# Patient Record
Sex: FEMALE | Race: WHITE | NOT HISPANIC OR LATINO | URBAN - METROPOLITAN AREA
[De-identification: names, ages, dates, MRNs, and addresses within clinical notes are randomized per-mention and may not be internally consistent; named-entity substitution may affect disease eponyms.]

---

## 2017-03-05 ENCOUNTER — INPATIENT (INPATIENT)
Facility: HOSPITAL | Age: 72
LOS: 3 days | Discharge: NURSING, REHAB OR LONG-TERM CARE | DRG: 438 | End: 2017-03-09
Attending: FAMILY MEDICINE | Admitting: FAMILY MEDICINE
Payer: MEDICARE

## 2017-03-05 VITALS
DIASTOLIC BLOOD PRESSURE: 90 MMHG | RESPIRATION RATE: 14 BRPM | SYSTOLIC BLOOD PRESSURE: 175 MMHG | HEART RATE: 100 BPM | OXYGEN SATURATION: 94 % | TEMPERATURE: 99 F | WEIGHT: 100.09 LBS | HEIGHT: 60 IN

## 2017-03-05 DIAGNOSIS — R10.84 GENERALIZED ABDOMINAL PAIN: ICD-10-CM

## 2017-03-05 DIAGNOSIS — Z41.8 ENCOUNTER FOR OTHER PROCEDURES FOR PURPOSES OTHER THAN REMEDYING HEALTH STATE: ICD-10-CM

## 2017-03-05 DIAGNOSIS — E83.52 HYPERCALCEMIA: ICD-10-CM

## 2017-03-05 DIAGNOSIS — A41.9 SEPSIS, UNSPECIFIED ORGANISM: ICD-10-CM

## 2017-03-05 DIAGNOSIS — G35 MULTIPLE SCLEROSIS: ICD-10-CM

## 2017-03-05 DIAGNOSIS — I16.0 HYPERTENSIVE URGENCY: ICD-10-CM

## 2017-03-05 DIAGNOSIS — J18.9 PNEUMONIA, UNSPECIFIED ORGANISM: ICD-10-CM

## 2017-03-05 DIAGNOSIS — J44.9 CHRONIC OBSTRUCTIVE PULMONARY DISEASE, UNSPECIFIED: ICD-10-CM

## 2017-03-05 DIAGNOSIS — R74.8 ABNORMAL LEVELS OF OTHER SERUM ENZYMES: ICD-10-CM

## 2017-03-05 LAB
ALBUMIN SERPL ELPH-MCNC: 2.3 G/DL — LOW (ref 3.3–5)
ALP SERPL-CCNC: 164 U/L — HIGH (ref 40–120)
ALT FLD-CCNC: 12 U/L — SIGNIFICANT CHANGE UP (ref 12–78)
AMYLASE P1 CFR SERPL: 307 U/L — HIGH (ref 25–115)
ANION GAP SERPL CALC-SCNC: 8 MMOL/L — SIGNIFICANT CHANGE UP (ref 5–17)
APPEARANCE UR: ABNORMAL
APTT BLD: 30.4 SEC — SIGNIFICANT CHANGE UP (ref 27.5–37.4)
AST SERPL-CCNC: 19 U/L — SIGNIFICANT CHANGE UP (ref 15–37)
BACTERIA # UR AUTO: ABNORMAL
BILIRUB SERPL-MCNC: 0.3 MG/DL — SIGNIFICANT CHANGE UP (ref 0.2–1.2)
BILIRUB UR-MCNC: ABNORMAL
BUN SERPL-MCNC: 33 MG/DL — HIGH (ref 7–23)
CALCIUM SERPL-MCNC: 11 MG/DL — HIGH (ref 8.5–10.1)
CHLORIDE SERPL-SCNC: 100 MMOL/L — SIGNIFICANT CHANGE UP (ref 96–108)
CO2 SERPL-SCNC: 32 MMOL/L — HIGH (ref 22–31)
COLOR SPEC: YELLOW — SIGNIFICANT CHANGE UP
COMMENT - URINE: SIGNIFICANT CHANGE UP
COMMENT - URINE: SIGNIFICANT CHANGE UP
CREAT SERPL-MCNC: 0.63 MG/DL — SIGNIFICANT CHANGE UP (ref 0.5–1.3)
CRP SERPL-MCNC: 20.58 MG/DL — HIGH (ref 0–0.4)
DIFF PNL FLD: ABNORMAL
EPI CELLS # UR: ABNORMAL
GLUCOSE SERPL-MCNC: 90 MG/DL — SIGNIFICANT CHANGE UP (ref 70–99)
GLUCOSE UR QL: NEGATIVE — SIGNIFICANT CHANGE UP
HCT VFR BLD CALC: 42.6 % — SIGNIFICANT CHANGE UP (ref 34.5–45)
HGB BLD-MCNC: 13.7 G/DL — SIGNIFICANT CHANGE UP (ref 11.5–15.5)
INR BLD: 1.26 RATIO — HIGH (ref 0.88–1.16)
KETONES UR-MCNC: NEGATIVE — SIGNIFICANT CHANGE UP
LACTATE SERPL-SCNC: 1.1 MMOL/L — SIGNIFICANT CHANGE UP (ref 0.7–2)
LEUKOCYTE ESTERASE UR-ACNC: ABNORMAL
LIDOCAIN IGE QN: 971 U/L — HIGH (ref 73–393)
LYMPHOCYTES # BLD AUTO: 4 % — LOW (ref 13–44)
MCHC RBC-ENTMCNC: 29.5 PG — SIGNIFICANT CHANGE UP (ref 27–34)
MCHC RBC-ENTMCNC: 32.2 GM/DL — SIGNIFICANT CHANGE UP (ref 32–36)
MCV RBC AUTO: 91.8 FL — SIGNIFICANT CHANGE UP (ref 80–100)
MONOCYTES NFR BLD AUTO: 1 % — SIGNIFICANT CHANGE UP (ref 1–9)
MYELOCYTES NFR BLD: 1 % — HIGH (ref 0–0)
NEUTROPHILS NFR BLD AUTO: 88 % — HIGH (ref 43–77)
NEUTS BAND # BLD: 6 % — SIGNIFICANT CHANGE UP (ref 0–8)
NITRITE UR-MCNC: NEGATIVE — SIGNIFICANT CHANGE UP
PH UR: 5 — SIGNIFICANT CHANGE UP (ref 4.8–8)
PLAT MORPH BLD: NORMAL — SIGNIFICANT CHANGE UP
PLATELET # BLD AUTO: 533 K/UL — HIGH (ref 150–400)
POTASSIUM SERPL-MCNC: 5.3 MMOL/L — SIGNIFICANT CHANGE UP (ref 3.5–5.3)
POTASSIUM SERPL-SCNC: 5.3 MMOL/L — SIGNIFICANT CHANGE UP (ref 3.5–5.3)
PROT SERPL-MCNC: 6.8 G/DL — SIGNIFICANT CHANGE UP (ref 6–8.3)
PROT UR-MCNC: 75 MG/DL
PROTHROM AB SERPL-ACNC: 14 SEC — HIGH (ref 10–13.1)
RAPID RVP RESULT: SIGNIFICANT CHANGE UP
RBC # BLD: 4.64 M/UL — SIGNIFICANT CHANGE UP (ref 3.8–5.2)
RBC # FLD: 13.4 % — SIGNIFICANT CHANGE UP (ref 10.3–14.5)
RBC BLD AUTO: NORMAL — SIGNIFICANT CHANGE UP
RBC CASTS # UR COMP ASSIST: >50 /HPF (ref 0–4)
SODIUM SERPL-SCNC: 140 MMOL/L — SIGNIFICANT CHANGE UP (ref 135–145)
SP GR SPEC: 1.02 — SIGNIFICANT CHANGE UP (ref 1.01–1.02)
UROBILINOGEN FLD QL: NEGATIVE — SIGNIFICANT CHANGE UP
WBC # BLD: 18 K/UL — HIGH (ref 3.8–10.5)
WBC # FLD AUTO: 18 K/UL — HIGH (ref 3.8–10.5)
WBC UR QL: ABNORMAL

## 2017-03-05 PROCEDURE — 93970 EXTREMITY STUDY: CPT | Mod: 26

## 2017-03-05 PROCEDURE — 93010 ELECTROCARDIOGRAM REPORT: CPT

## 2017-03-05 PROCEDURE — 74177 CT ABD & PELVIS W/CONTRAST: CPT | Mod: 26

## 2017-03-05 PROCEDURE — 99223 1ST HOSP IP/OBS HIGH 75: CPT | Mod: AI,GC

## 2017-03-05 PROCEDURE — 99285 EMERGENCY DEPT VISIT HI MDM: CPT

## 2017-03-05 PROCEDURE — 71275 CT ANGIOGRAPHY CHEST: CPT | Mod: 26

## 2017-03-05 RX ORDER — GLYCERIN ADULT
1 SUPPOSITORY, RECTAL RECTAL DAILY
Qty: 0 | Refills: 0 | Status: DISCONTINUED | OUTPATIENT
Start: 2017-03-05 | End: 2017-03-09

## 2017-03-05 RX ORDER — MAGNESIUM HYDROXIDE 400 MG/1
30 TABLET, CHEWABLE ORAL DAILY
Qty: 0 | Refills: 0 | Status: DISCONTINUED | OUTPATIENT
Start: 2017-03-05 | End: 2017-03-09

## 2017-03-05 RX ORDER — ALPRAZOLAM 0.25 MG
0.25 TABLET ORAL AT BEDTIME
Qty: 0 | Refills: 0 | Status: DISCONTINUED | OUTPATIENT
Start: 2017-03-05 | End: 2017-03-09

## 2017-03-05 RX ORDER — FLUTICASONE PROPIONATE AND SALMETEROL 50; 250 UG/1; UG/1
1 POWDER ORAL; RESPIRATORY (INHALATION)
Qty: 0 | Refills: 0 | COMMUNITY

## 2017-03-05 RX ORDER — ALPRAZOLAM 0.25 MG
0.25 TABLET ORAL ONCE
Qty: 0 | Refills: 0 | Status: DISCONTINUED | OUTPATIENT
Start: 2017-03-05 | End: 2017-03-05

## 2017-03-05 RX ORDER — IPRATROPIUM/ALBUTEROL SULFATE 18-103MCG
3 AEROSOL WITH ADAPTER (GRAM) INHALATION ONCE
Qty: 0 | Refills: 0 | Status: COMPLETED | OUTPATIENT
Start: 2017-03-05 | End: 2017-03-05

## 2017-03-05 RX ORDER — SODIUM CHLORIDE 9 MG/ML
3 INJECTION INTRAMUSCULAR; INTRAVENOUS; SUBCUTANEOUS ONCE
Qty: 0 | Refills: 0 | Status: COMPLETED | OUTPATIENT
Start: 2017-03-05 | End: 2017-03-05

## 2017-03-05 RX ORDER — TRAMADOL HYDROCHLORIDE 50 MG/1
50 TABLET ORAL EVERY 6 HOURS
Qty: 0 | Refills: 0 | Status: DISCONTINUED | OUTPATIENT
Start: 2017-03-05 | End: 2017-03-09

## 2017-03-05 RX ORDER — LEVOTHYROXINE SODIUM 125 MCG
100 TABLET ORAL DAILY
Qty: 0 | Refills: 0 | Status: DISCONTINUED | OUTPATIENT
Start: 2017-03-05 | End: 2017-03-09

## 2017-03-05 RX ORDER — LEVOTHYROXINE SODIUM 125 MCG
1 TABLET ORAL
Qty: 0 | Refills: 0 | COMMUNITY

## 2017-03-05 RX ORDER — TIOTROPIUM BROMIDE 18 UG/1
1 CAPSULE ORAL; RESPIRATORY (INHALATION)
Qty: 0 | Refills: 0 | COMMUNITY

## 2017-03-05 RX ORDER — FLUTICASONE PROPIONATE AND SALMETEROL 50; 250 UG/1; UG/1
1 POWDER ORAL; RESPIRATORY (INHALATION)
Qty: 0 | Refills: 0 | Status: DISCONTINUED | OUTPATIENT
Start: 2017-03-05 | End: 2017-03-09

## 2017-03-05 RX ORDER — GABAPENTIN 400 MG/1
200 CAPSULE ORAL DAILY
Qty: 0 | Refills: 0 | Status: DISCONTINUED | OUTPATIENT
Start: 2017-03-05 | End: 2017-03-09

## 2017-03-05 RX ORDER — PANTOPRAZOLE SODIUM 20 MG/1
40 TABLET, DELAYED RELEASE ORAL
Qty: 0 | Refills: 0 | Status: DISCONTINUED | OUTPATIENT
Start: 2017-03-05 | End: 2017-03-09

## 2017-03-05 RX ORDER — BACLOFEN 100 %
30 POWDER (GRAM) MISCELLANEOUS
Qty: 0 | Refills: 0 | COMMUNITY

## 2017-03-05 RX ORDER — GABAPENTIN 400 MG/1
2 CAPSULE ORAL
Qty: 0 | Refills: 0 | COMMUNITY

## 2017-03-05 RX ORDER — ONDANSETRON 8 MG/1
4 TABLET, FILM COATED ORAL EVERY 6 HOURS
Qty: 0 | Refills: 0 | Status: DISCONTINUED | OUTPATIENT
Start: 2017-03-05 | End: 2017-03-09

## 2017-03-05 RX ORDER — SENNA PLUS 8.6 MG/1
2 TABLET ORAL AT BEDTIME
Qty: 0 | Refills: 0 | Status: DISCONTINUED | OUTPATIENT
Start: 2017-03-05 | End: 2017-03-09

## 2017-03-05 RX ORDER — DOCUSATE SODIUM 100 MG
100 CAPSULE ORAL DAILY
Qty: 0 | Refills: 0 | Status: DISCONTINUED | OUTPATIENT
Start: 2017-03-05 | End: 2017-03-09

## 2017-03-05 RX ORDER — FUROSEMIDE 40 MG
20 TABLET ORAL EVERY OTHER DAY
Qty: 0 | Refills: 0 | Status: DISCONTINUED | OUTPATIENT
Start: 2017-03-05 | End: 2017-03-06

## 2017-03-05 RX ORDER — ENOXAPARIN SODIUM 100 MG/ML
40 INJECTION SUBCUTANEOUS DAILY
Qty: 0 | Refills: 0 | Status: DISCONTINUED | OUTPATIENT
Start: 2017-03-05 | End: 2017-03-09

## 2017-03-05 RX ORDER — IOHEXOL 300 MG/ML
30 INJECTION, SOLUTION INTRAVENOUS ONCE
Qty: 0 | Refills: 0 | Status: DISCONTINUED | OUTPATIENT
Start: 2017-03-05 | End: 2017-03-09

## 2017-03-05 RX ORDER — BACLOFEN 100 %
30 POWDER (GRAM) MISCELLANEOUS DAILY
Qty: 0 | Refills: 0 | Status: DISCONTINUED | OUTPATIENT
Start: 2017-03-05 | End: 2017-03-06

## 2017-03-05 RX ORDER — METOPROLOL TARTRATE 50 MG
5 TABLET ORAL ONCE
Qty: 0 | Refills: 0 | Status: COMPLETED | OUTPATIENT
Start: 2017-03-05 | End: 2017-03-05

## 2017-03-05 RX ORDER — MAGNESIUM HYDROXIDE 400 MG/1
0 TABLET, CHEWABLE ORAL
Qty: 0 | Refills: 0 | COMMUNITY

## 2017-03-05 RX ORDER — CHOLECALCIFEROL (VITAMIN D3) 125 MCG
0 CAPSULE ORAL
Qty: 0 | Refills: 0 | COMMUNITY

## 2017-03-05 RX ORDER — SODIUM CHLORIDE 9 MG/ML
1000 INJECTION INTRAMUSCULAR; INTRAVENOUS; SUBCUTANEOUS
Qty: 0 | Refills: 0 | Status: DISCONTINUED | OUTPATIENT
Start: 2017-03-05 | End: 2017-03-06

## 2017-03-05 RX ORDER — TRAMADOL HYDROCHLORIDE 50 MG/1
0 TABLET ORAL
Qty: 0 | Refills: 0 | COMMUNITY

## 2017-03-05 RX ORDER — TIOTROPIUM BROMIDE 18 UG/1
1 CAPSULE ORAL; RESPIRATORY (INHALATION) DAILY
Qty: 0 | Refills: 0 | Status: DISCONTINUED | OUTPATIENT
Start: 2017-03-05 | End: 2017-03-09

## 2017-03-05 RX ADMIN — Medication 3 MILLILITER(S): at 08:59

## 2017-03-05 RX ADMIN — SODIUM CHLORIDE 3 MILLILITER(S): 9 INJECTION INTRAMUSCULAR; INTRAVENOUS; SUBCUTANEOUS at 09:08

## 2017-03-05 RX ADMIN — SODIUM CHLORIDE 75 MILLILITER(S): 9 INJECTION INTRAMUSCULAR; INTRAVENOUS; SUBCUTANEOUS at 15:14

## 2017-03-05 RX ADMIN — Medication 200 MILLIGRAM(S): at 21:27

## 2017-03-05 RX ADMIN — Medication 125 MILLIGRAM(S): at 10:48

## 2017-03-05 RX ADMIN — Medication 200 MILLIGRAM(S): at 17:24

## 2017-03-05 RX ADMIN — FLUTICASONE PROPIONATE AND SALMETEROL 1 DOSE(S): 50; 250 POWDER ORAL; RESPIRATORY (INHALATION) at 17:24

## 2017-03-05 RX ADMIN — ENOXAPARIN SODIUM 40 MILLIGRAM(S): 100 INJECTION SUBCUTANEOUS at 17:24

## 2017-03-05 RX ADMIN — Medication 100 MILLIGRAM(S): at 21:28

## 2017-03-05 RX ADMIN — Medication 0.25 MILLIGRAM(S): at 21:28

## 2017-03-05 RX ADMIN — TIOTROPIUM BROMIDE 1 CAPSULE(S): 18 CAPSULE ORAL; RESPIRATORY (INHALATION) at 15:12

## 2017-03-05 RX ADMIN — Medication 0.25 MILLIGRAM(S): at 14:52

## 2017-03-05 RX ADMIN — Medication 200 MILLIGRAM(S): at 14:53

## 2017-03-05 RX ADMIN — Medication 5 MILLIGRAM(S): at 13:17

## 2017-03-05 RX ADMIN — Medication 100 MILLIGRAM(S): at 15:17

## 2017-03-05 NOTE — H&P ADULT. - PROBLEM SELECTOR PLAN 3
- Likely secondary to constipation. CT sig for constipation and ileus, likely secondary to pts MS  - Pain relieved after BM.   - cont miralax, colace and senna  - can consider additional measures if constipation worsens, currently stable

## 2017-03-05 NOTE — H&P ADULT. - PROBLEM SELECTOR PLAN 5
- corrected Ca 12.3  - Pt stable, no EKG changes  - Possibly secondary to vit D administration  - On lasix  - will monitor - corrected Ca 12.3  - Pt stable, no EKG changes  - Possibly secondary to vit D administration  - On lasix  - Will monitor. Consider renal consult if calcium remains elevated. - GI consult  - GB, liver and pancreas unremarkable on CT  - possibly iatrogenic vs pancreatitis   - will trend amylase/lipase  - consider MRI/ERCP if remains elevated -acute  - GI consult  - GB, liver and pancreas unremarkable on CT  - possibly iatrogenic vs pancreatitis   - will trend amylase/lipase  - consider MRI/ERCP if remains elevated

## 2017-03-05 NOTE — H&P ADULT. - RADIOLOGY RESULTS AND INTERPRETATION
CTA/ CT abd/pelvis: No pulmonary emboli. Emphysema.  Left lower lobe pneumonia with trace left parapneumonic effusion.  Small parenchymal nodules in the right and left lower lobes as described.   Recommend three-month CT surveillance.  Constipation. Generalized ileus.

## 2017-03-05 NOTE — ED ADULT NURSE REASSESSMENT NOTE - NS ED NURSE REASSESS COMMENT FT1
Son at bedside stating pt's breathing has become increasingly worse over the past couple of months, Dr Forbes aware, CTA ordered.

## 2017-03-05 NOTE — H&P ADULT. - PROBLEM SELECTOR PLAN 4
- GB, liver and pancreas unremarkable on CT  - possibly iatrogenic vs pancreatitis   - will trend amylase/lipase  - consider MRI/ERCP if remains elevated - GI consult  - GB, liver and pancreas unremarkable on CT  - possibly iatrogenic vs pancreatitis   - will trend amylase/lipase  - consider MRI/ERCP if remains elevated - vs anxiety  - Received Lopressor 5mg IVP  - Monitor BP, consider anxiolytic after manual BP

## 2017-03-05 NOTE — H&P ADULT. - ASSESSMENT
70yo F with PMHx of MS and COPD presents with abd pain and SOB admitted with sepsis secondary to HCAP

## 2017-03-05 NOTE — H&P ADULT. - ATTENDING COMMENTS
70yo F with PMHx of MS and COPD presents with abd pain and SOB admitted with sepsis secondary to HCAP. Plan: f/u labs, received dose antibx, lipase elevated, trend, gentle ivf, consider iv antibx empiric coverage dependending on pct and crp, apprec pulm recs, apprec GI recs, i/os, monitor clinical course, elevated bp, responsive to metoprolol, reassess elevated bp vs anxiety and meds prn

## 2017-03-05 NOTE — ED ADULT NURSE REASSESSMENT NOTE - NS ED NURSE REASSESS COMMENT FT1
S/P CT A.P. Awaiting results.  Med list sent form [facility], call placed to Nursing Supervisor Chasity who provided me with an accurate list of current medications. Meds entered into outpatient medication review.

## 2017-03-05 NOTE — H&P ADULT. - PROBLEM SELECTOR PLAN 1
- Admit to [facility]  - Likely secondary to HCAP  - Leukocytosis and tachycardia present on admission. While possible that leuokocytosis is secondary to steroid use, will treat for sepsis 2 to PNA based on clinical findings and correlation with CT.   - Lactate 1.1  - Received levaquin x1 in ED. Will continue levaquin   - f/u blood and urine cx, am labs, procalcitonin   - f/u pulm consult (Dr. Ballesteros) - Admit to [facility]  - Likely secondary to HCAP  - Leukocytosis and tachycardia present on admission. While possible that leuokocytosis is secondary to steroid use, will treat for sepsis 2 to PNA based on clinical findings and correlation with CT.   - Lactate 1.1  - Received levaquin x1 in ED  - f/u blood and urine cx, am labs, procalcitonin, CRP   - f/u pulm consult (Dr. Ballesteros) - Admit to [facility]  - Likely secondary to HCAP  - Leukocytosis and tachycardia present on admission. While possible that leuokocytosis is secondary to steroid use, will treat for sepsis 2 to PNA based on clinical findings and correlation with CT.   - Lactate 1.1  - Received levaquin x1 in ED. Since pt was recently treated with Abx, will consider continuing levaquin after f/u procalcitonin and pulm recs  - f/u blood and urine cx, am labs, procalcitonin, CRP   - f/u pulm consult (Dr. Ballesteros)  - strict Is&Os

## 2017-03-05 NOTE — ED PROVIDER NOTE - OBJECTIVE STATEMENT
pt with hx copd, ms, chronically bed bound bib ems from SNF for abd pain and constipation x 5 days. pt reports had enema yest, has BM, and abd pain resolved. no fevers, chills, ha, cp, n/v.  pmd - jun

## 2017-03-05 NOTE — H&P ADULT. - HISTORY OF PRESENT ILLNESS
72yo F with PMHx of MS and COPD BIBA from SNF for abdominal pain and SOB.     In ED patient received levaquin, duonebs, 125mg IV solu-medrol. Amylase, CBC, blood culture, urine culture, lactate, lipase, PT/INR, PTT, rapid RVP, UA, CT abd/pelvis, CTA, EKG were ordered.   Significant for WBC 18.0 Alk Phos 164 Lipase 971 Amylase 307    CTA and CT abdomen/pelvis: No pulmonary emboli. Emphysema. Left lower lobe pneumonia with trace left parapneumonic effusion. Small parenchymal nodules in the right and left lower lobes as described. Recommend three-month CT surveillance. Constipation. Generalized ileus.    Vitals: /90  RR 14 O2 sat 94% T 99 oral 70yo F with PMHx of MS and COPD BIBA from [facility] for abdominal pain and SOB. Patient states that she has had  SOB, productive cough, rhinorrhea, decreased appetite, dizziness, and constipation x 1 week. Patient admits to 5 episodes of non-bloody vomiting last night. Patient states her abdominal pain was 8/10 at its worst last night. She was given fleet enema last night and had a BM which resolved the abdominal pain. Patient denies CP, lightheadedness, HA, dysuria, pain with inspiration, calf tenderness, palpitations, incontinence. Denies sick contacts, muscle aches. Patient is bedbound at baseline.    In ED patient received levaquin, duonebs, 125mg IV solu-medrol. Amylase, CBC, blood culture, urine culture, lactate, lipase, PT/INR, PTT, rapid RVP, UA, CT abd/pelvis, CTA, EKG were ordered.   Significant for WBC 18.0 Alk Phos 164 Lipase 971 Amylase 307    CTA and CT abdomen/pelvis: No pulmonary emboli. Emphysema. Left lower lobe pneumonia with trace left parapneumonic effusion. Small parenchymal nodules in the right and left lower lobes as described. Recommend three-month CT surveillance. Constipation. Generalized ileus.    Vitals: /90  RR 14 O2 sat 94% T 99 oral    Patient remained hypertensive (186/112) and tachycardic (105) in the ED and was given Lopressor 5 mg IVP once. 72yo F with PMHx of MS and COPD BIBA from [facility] for abdominal pain and SOB. Patient states that she has had  SOB, productive cough, rhinorrhea, decreased appetite, dizziness, and constipation x 1 week. Patient admits to 5 episodes of non-bloody vomiting last night. Patient states her abdominal pain was 8/10 at its worst last night. She was given fleet enema last night and had a BM which resolved the abdominal pain. Patient denies CP, lightheadedness, HA, dysuria, pain with inspiration, calf tenderness, palpitations, incontinence. Denies sick contacts, muscle aches. Patient is bedbound at baseline.  Patient recently finished a course of steroids and antibiotics at .     In ED patient received levaquin, duonebs, 125mg IV solu-medrol. Amylase, CBC, blood culture, urine culture, lactate, lipase, PT/INR, PTT, rapid RVP, UA, CT abd/pelvis, CTA, EKG were ordered.   Significant for WBC 18.0 Alk Phos 164 Lipase 971 Amylase 307    CTA and CT abdomen/pelvis: No pulmonary emboli. Emphysema. Left lower lobe pneumonia with trace left parapneumonic effusion. Small parenchymal nodules in the right and left lower lobes as described. Recommend three-month CT surveillance. Constipation. Generalized ileus.    Vitals: /90  RR 14 O2 sat 94% T 99 oral    Patient remained hypertensive (186/112) and tachycardic (105) in the ED and was given Lopressor 5 mg IVP once. 72yo F with PMHx of MS and COPD BIBA from [facility] for abdominal pain and SOB. Patient states that she has had  SOB, productive cough, rhinorrhea, decreased appetite, dizziness, and constipation x 1 week. Patient admits to 5 episodes of non-bloody vomiting last night. Patient states her abdominal pain was 8/10 at its worst last night. She was given fleet enema last night and had a BM which resolved the abdominal pain. Patient denies CP, lightheadedness, HA, dysuria, pain with inspiration, calf tenderness, palpitations, incontinence. Denies sick contacts, muscle aches. Patient is bedbound at baseline.  As per Dr. Ballesteros, patient recently finished a course of steroids and antibiotics at .     In ED patient received levaquin, duonebs, 125mg IV solu-medrol. Amylase, CBC, blood culture, urine culture, lactate, lipase, PT/INR, PTT, rapid RVP, UA, CT abd/pelvis, CTA, EKG were ordered.   Significant for WBC 18.0 Alk Phos 164 Lipase 971 Amylase 307    CTA and CT abdomen/pelvis: No pulmonary emboli. Emphysema. Left lower lobe pneumonia with trace left parapneumonic effusion. Small parenchymal nodules in the right and left lower lobes as described. Recommend three-month CT surveillance. Constipation. Generalized ileus.    Vitals: /90  RR 14 O2 sat 94% T 99 oral    Patient remained hypertensive (186/112) and tachycardic (105) in the ED and was given Lopressor 5 mg IVP once.

## 2017-03-05 NOTE — ED ADULT NURSE NOTE - OBJECTIVE STATEMENT
Presents to ER w c/o generalized abd pain and SOB. (Pt has COPD, requesting neb). Breath sounds diminished throughout.  As per pt, states she has been constipated and was given a fleet enema last night at [facility]. Presents to ER w c/o generalized abd pain and SOB. (Pt has COPD, requesting neb). Breath sounds diminished throughout.  As per pt, states she has been constipated and was given a fleet enema last night at [facility]. Pt states "I feel much better after the enema".

## 2017-03-05 NOTE — H&P ADULT. - PROBLEM SELECTOR PLAN 7
- continue current regimen  - f/u pulm consult - continue current regimen -chronic  - continue current regimen

## 2017-03-05 NOTE — H&P ADULT. - PROBLEM SELECTOR PLAN 6
- continue current regimen - corrected Ca 12.3  - Pt stable, no EKG changes  - Possibly secondary to vit D administration  - On lasix  - Will monitor. Consider renal consult if calcium remains elevated. -acute vs chronic  - corrected Ca 12.3  - Pt stable, no EKG changes  - Possibly secondary to vit D administration  - On lasix  - Will monitor. Consider renal consult if calcium remains elevated.

## 2017-03-06 ENCOUNTER — TRANSCRIPTION ENCOUNTER (OUTPATIENT)
Age: 72
End: 2017-03-06

## 2017-03-06 LAB
AMYLASE P1 CFR SERPL: 45 U/L — SIGNIFICANT CHANGE UP (ref 25–115)
ANION GAP SERPL CALC-SCNC: 8 MMOL/L — SIGNIFICANT CHANGE UP (ref 5–17)
BUN SERPL-MCNC: 27 MG/DL — HIGH (ref 7–23)
CALCIUM SERPL-MCNC: 9.7 MG/DL — SIGNIFICANT CHANGE UP (ref 8.5–10.1)
CHLORIDE SERPL-SCNC: 102 MMOL/L — SIGNIFICANT CHANGE UP (ref 96–108)
CHOLEST SERPL-MCNC: 171 MG/DL — SIGNIFICANT CHANGE UP (ref 10–199)
CO2 SERPL-SCNC: 32 MMOL/L — HIGH (ref 22–31)
CREAT SERPL-MCNC: 0.5 MG/DL — SIGNIFICANT CHANGE UP (ref 0.5–1.3)
CULTURE RESULTS: SIGNIFICANT CHANGE UP
GLUCOSE SERPL-MCNC: 74 MG/DL — SIGNIFICANT CHANGE UP (ref 70–99)
HCT VFR BLD CALC: 34.3 % — LOW (ref 34.5–45)
HGB BLD-MCNC: 10.8 G/DL — LOW (ref 11.5–15.5)
LIDOCAIN IGE QN: 85 U/L — SIGNIFICANT CHANGE UP (ref 73–393)
MAGNESIUM SERPL-MCNC: 2.3 MG/DL — SIGNIFICANT CHANGE UP (ref 1.8–2.4)
MCHC RBC-ENTMCNC: 29.1 PG — SIGNIFICANT CHANGE UP (ref 27–34)
MCHC RBC-ENTMCNC: 31.5 GM/DL — LOW (ref 32–36)
MCV RBC AUTO: 92.4 FL — SIGNIFICANT CHANGE UP (ref 80–100)
PHOSPHATE SERPL-MCNC: 2.6 MG/DL — SIGNIFICANT CHANGE UP (ref 2.5–4.5)
PLATELET # BLD AUTO: 481 K/UL — HIGH (ref 150–400)
POTASSIUM SERPL-MCNC: 4.2 MMOL/L — SIGNIFICANT CHANGE UP (ref 3.5–5.3)
POTASSIUM SERPL-SCNC: 4.2 MMOL/L — SIGNIFICANT CHANGE UP (ref 3.5–5.3)
RBC # BLD: 3.71 M/UL — LOW (ref 3.8–5.2)
RBC # FLD: 13.4 % — SIGNIFICANT CHANGE UP (ref 10.3–14.5)
SODIUM SERPL-SCNC: 142 MMOL/L — SIGNIFICANT CHANGE UP (ref 135–145)
SPECIMEN SOURCE: SIGNIFICANT CHANGE UP
TRIGL SERPL-MCNC: 118 MG/DL — SIGNIFICANT CHANGE UP (ref 10–149)
TSH SERPL-MCNC: 3.86 UIU/ML — HIGH (ref 0.36–3.74)
WBC # BLD: 13.6 K/UL — HIGH (ref 3.8–10.5)
WBC # FLD AUTO: 13.6 K/UL — HIGH (ref 3.8–10.5)

## 2017-03-06 PROCEDURE — 76705 ECHO EXAM OF ABDOMEN: CPT | Mod: 26

## 2017-03-06 PROCEDURE — 99233 SBSQ HOSP IP/OBS HIGH 50: CPT | Mod: GC

## 2017-03-06 PROCEDURE — 71010: CPT | Mod: 26

## 2017-03-06 RX ORDER — ALBUTEROL 90 UG/1
2.5 AEROSOL, METERED ORAL EVERY 8 HOURS
Qty: 0 | Refills: 0 | Status: DISCONTINUED | OUTPATIENT
Start: 2017-03-06 | End: 2017-03-09

## 2017-03-06 RX ORDER — BACLOFEN 100 %
30 POWDER (GRAM) MISCELLANEOUS AT BEDTIME
Qty: 0 | Refills: 0 | Status: DISCONTINUED | OUTPATIENT
Start: 2017-03-06 | End: 2017-03-06

## 2017-03-06 RX ORDER — FUROSEMIDE 40 MG
20 TABLET ORAL EVERY OTHER DAY
Qty: 0 | Refills: 0 | Status: DISCONTINUED | OUTPATIENT
Start: 2017-03-06 | End: 2017-03-06

## 2017-03-06 RX ORDER — ACETAMINOPHEN 500 MG
650 TABLET ORAL ONCE
Qty: 0 | Refills: 0 | Status: COMPLETED | OUTPATIENT
Start: 2017-03-06 | End: 2017-03-06

## 2017-03-06 RX ORDER — FUROSEMIDE 40 MG
20 TABLET ORAL ONCE
Qty: 0 | Refills: 0 | Status: DISCONTINUED | OUTPATIENT
Start: 2017-03-06 | End: 2017-03-06

## 2017-03-06 RX ORDER — FUROSEMIDE 40 MG
20 TABLET ORAL ONCE
Qty: 0 | Refills: 0 | Status: COMPLETED | OUTPATIENT
Start: 2017-03-06 | End: 2017-03-06

## 2017-03-06 RX ORDER — MULTIVIT-MIN/FERROUS GLUCONATE 9 MG/15 ML
1 LIQUID (ML) ORAL DAILY
Qty: 0 | Refills: 0 | Status: CANCELLED | OUTPATIENT
Start: 2017-03-06 | End: 2017-03-09

## 2017-03-06 RX ORDER — FUROSEMIDE 40 MG
20 TABLET ORAL EVERY OTHER DAY
Qty: 0 | Refills: 0 | Status: DISCONTINUED | OUTPATIENT
Start: 2017-03-07 | End: 2017-03-09

## 2017-03-06 RX ORDER — BACLOFEN 100 %
30 POWDER (GRAM) MISCELLANEOUS AT BEDTIME
Qty: 0 | Refills: 0 | Status: DISCONTINUED | OUTPATIENT
Start: 2017-03-07 | End: 2017-03-09

## 2017-03-06 RX ADMIN — ALBUTEROL 2.5 MILLIGRAM(S): 90 AEROSOL, METERED ORAL at 15:05

## 2017-03-06 RX ADMIN — TIOTROPIUM BROMIDE 1 CAPSULE(S): 18 CAPSULE ORAL; RESPIRATORY (INHALATION) at 05:30

## 2017-03-06 RX ADMIN — PANTOPRAZOLE SODIUM 40 MILLIGRAM(S): 20 TABLET, DELAYED RELEASE ORAL at 05:31

## 2017-03-06 RX ADMIN — Medication 100 MILLIGRAM(S): at 05:33

## 2017-03-06 RX ADMIN — Medication 1 DROP(S): at 12:03

## 2017-03-06 RX ADMIN — ALBUTEROL 2.5 MILLIGRAM(S): 90 AEROSOL, METERED ORAL at 23:23

## 2017-03-06 RX ADMIN — GABAPENTIN 200 MILLIGRAM(S): 400 CAPSULE ORAL at 12:03

## 2017-03-06 RX ADMIN — Medication 200 MILLIGRAM(S): at 05:30

## 2017-03-06 RX ADMIN — Medication 30 MILLIGRAM(S): at 05:31

## 2017-03-06 RX ADMIN — Medication 100 MILLIGRAM(S): at 12:03

## 2017-03-06 RX ADMIN — Medication 20 MILLIGRAM(S): at 13:18

## 2017-03-06 RX ADMIN — ENOXAPARIN SODIUM 40 MILLIGRAM(S): 100 INJECTION SUBCUTANEOUS at 12:03

## 2017-03-06 RX ADMIN — Medication 650 MILLIGRAM(S): at 12:03

## 2017-03-06 RX ADMIN — Medication 650 MILLIGRAM(S): at 12:00

## 2017-03-06 RX ADMIN — Medication 100 MILLIGRAM(S): at 21:30

## 2017-03-06 RX ADMIN — SENNA PLUS 2 TABLET(S): 8.6 TABLET ORAL at 21:30

## 2017-03-06 RX ADMIN — Medication 200 MILLIGRAM(S): at 21:30

## 2017-03-06 RX ADMIN — Medication 30 MILLIGRAM(S): at 12:03

## 2017-03-06 RX ADMIN — Medication 100 MICROGRAM(S): at 05:31

## 2017-03-06 RX ADMIN — Medication 0.25 MILLIGRAM(S): at 21:30

## 2017-03-06 RX ADMIN — ALBUTEROL 2.5 MILLIGRAM(S): 90 AEROSOL, METERED ORAL at 07:58

## 2017-03-06 RX ADMIN — FLUTICASONE PROPIONATE AND SALMETEROL 1 DOSE(S): 50; 250 POWDER ORAL; RESPIRATORY (INHALATION) at 05:30

## 2017-03-06 RX ADMIN — FLUTICASONE PROPIONATE AND SALMETEROL 1 DOSE(S): 50; 250 POWDER ORAL; RESPIRATORY (INHALATION) at 17:45

## 2017-03-06 NOTE — DISCHARGE NOTE ADULT - PATIENT PORTAL LINK FT
“You can access the Follow[facility] Patient Portal, offered by [facility], by registering with the following website: [email or web address]”

## 2017-03-06 NOTE — DISCHARGE NOTE ADULT - CARE PLAN
Principal Discharge DX:	Generalized abdominal pain Principal Discharge DX:	Generalized abdominal pain  Goal:	Improved  Instructions for follow-up, activity and diet:	likely to due constipation and subclinical pancreatitis.  Continue with senna, colace, milk of magnesia and ?magnesium hydroxide suspension as needed for constipation.  Follow up with PCP, Dr. Aguirre, within 3 days.  Secondary Diagnosis:	Sepsis  Instructions for follow-up, activity and diet:	due to PNA.  Already completed dose of antibiotics as outpatient prior to admission.  No antibiotics at this time.  Secondary Diagnosis:	COPD (chronic obstructive pulmonary disease)  Instructions for follow-up, activity and diet:	Please taper prednisone.  Please take ?20mg for 2 days and then 10mg for 2 days.  Continue protonix while on steroids as stenions can irritate stomach.    Please continue Spiriva and Advair.    Please continue tessalon perle and robitussin as needed for cough.  Secondary Diagnosis:	Diastolic CHF  Instructions for follow-up, activity and diet:	Continue with lasix 20mg every other day.  Secondary Diagnosis:	MS (multiple sclerosis)  Instructions for follow-up, activity and diet:	Continue baclofen and gabapentin.  Secondary Diagnosis:	Hypothyroid  Instructions for follow-up, activity and diet:	Continue with synthroid.    TSH 3.86.  Please follow up with PCP, Dr. Aguirre, for further management of hypothyroidism.  Secondary Diagnosis:	HTN (hypertension)  Goal:	New diagnosis  Instructions for follow-up, activity and diet:	Blood pressure remained elevated through out hospital stay.  Please continue coreg 3.125mg twice a day.  Please follow up with PCP, Dr. Aguirre, for further management of HTN. Principal Discharge DX:	Generalized abdominal pain  Goal:	Improved  Instructions for follow-up, activity and diet:	likely to due constipation and subclinical pancreatitis.  Continue with senna, colace, milk of magnesia, lactulose and dulcolax suspension as needed for constipation.  Follow up with PCP, Dr. Aguirre, within 3 days.  Secondary Diagnosis:	Sepsis  Instructions for follow-up, activity and diet:	due to pneumonia.    Already completed 2 courses of antibiotics as outpatient prior to admission.  No antibiotics at this time.  Secondary Diagnosis:	COPD (chronic obstructive pulmonary disease)  Instructions for follow-up, activity and diet:	Please take Prednisone 15mg for 5 days.  Continue protonix while on steroids as stenions can irritate stomach.    Please continue Spiriva and Advair.    Please continue tessalon perle and robitussin as needed for cough.  Secondary Diagnosis:	Diastolic CHF  Instructions for follow-up, activity and diet:	Continue with lasix 20mg every other day.  Secondary Diagnosis:	MS (multiple sclerosis)  Instructions for follow-up, activity and diet:	Continue baclofen and gabapentin.  Secondary Diagnosis:	Hypothyroid  Instructions for follow-up, activity and diet:	Continue with synthroid.    TSH 3.86.  Please follow up with PCP, Dr. Aguirre, for further management of hypothyroidism.  Secondary Diagnosis:	HTN (hypertension)  Goal:	New diagnosis  Instructions for follow-up, activity and diet:	Blood pressure remained elevated through out hospital stay.  Please continue coreg 3.125mg twice a day.  Please follow up with PCP, Dr. Aguirre, for further management of HTN. Principal Discharge DX:	Generalized abdominal pain  Goal:	Improved  Instructions for follow-up, activity and diet:	likely to due constipation and mild acute pancreatitis.  Continue with senna, colace, milk of magnesia, lactulose and dulcolax suspension as needed for constipation.  Follow up with PCP, Dr. Sánchez, within 3 days.  Follow up with GI, Dr. Candelario, within one week.  Secondary Diagnosis:	Sepsis  Instructions for follow-up, activity and diet:	due to pneumonia.    Already completed 2 courses of antibiotics as outpatient prior to admission.  No antibiotics at this time.  Secondary Diagnosis:	COPD (chronic obstructive pulmonary disease)  Instructions for follow-up, activity and diet:	Please take Prednisone 15mg for 5 days.  Continue protonix while on steroids as steroids can irritate stomach.    Please continue Spiriva and Advair.    Please continue tessalon perle and robitussin as needed for cough.  Follow up with Pulmonary, Dr. Ballesteros, within 3 days.  Secondary Diagnosis:	Diastolic CHF  Instructions for follow-up, activity and diet:	Continue with lasix 20mg every other day.  Secondary Diagnosis:	MS (multiple sclerosis)  Instructions for follow-up, activity and diet:	Continue baclofen and gabapentin.  Secondary Diagnosis:	Hypothyroid  Instructions for follow-up, activity and diet:	Continue with synthroid.    TSH 3.86.  Please follow up with PCP, Dr. Sánchez, for further management of hypothyroidism.  Secondary Diagnosis:	HTN (hypertension)  Goal:	New diagnosis  Instructions for follow-up, activity and diet:	Blood pressure remained elevated through out hospital stay.  No antihypertensive medication started as patient refused.  Please follow up with PCP, Dr. Sánchez, for further management of HTN. Principal Discharge DX:	Generalized abdominal pain  Goal:	Improved  Instructions for follow-up, activity and diet:	Likely due to constipation and mild acute pancreatitis (may have had a transient gallstone pancreatitis with a small stone that passed sponteously).  Continue with senna, colace, milk of magnesia, lactulose and dulcolax suspension as needed for constipation.  Follow up with PCP, Dr. Sánchez, within 3 days.  Follow up with GI, Dr. Candelario, within one week. Eat a low fat, low cholesterol diet.   If you have recurrence of significant abdominal pain, call your doctor or return to the ER.  Secondary Diagnosis:	COPD (chronic obstructive pulmonary disease)  Instructions for follow-up, activity and diet:	Please take Prednisone 15mg once daily for 5 days (3/10/17 through 3/14/17) to complete the steroid taper.    Please continue Spiriva and Advair and albuterol every 8 hours for now.    Follow up with Pulmonary, Dr. Ballesteros, within 3 days to further adjust respiratory regimen, as needed.  You should have repeat pulmonary imaging in the coming months as per your pulmonologist's recommendation.  Secondary Diagnosis:	Diastolic CHF  Instructions for follow-up, activity and diet:	Continue with lasix 20mg every other day. Follow up with PMD or cardiologist within a week.  Secondary Diagnosis:	MS (multiple sclerosis)  Instructions for follow-up, activity and diet:	Continue baclofen and gabapentin.  Secondary Diagnosis:	Hypothyroid  Instructions for follow-up, activity and diet:	Continue with synthroid.    TSH 3.86.  Please follow up with PCP, Dr. Sánhcez, for further management of hypothyroidism.  Secondary Diagnosis:	HTN (hypertension)  Instructions for follow-up, activity and diet:	Blood pressure remained elevated through out hospital stay. No antihypertensive medication started as patient refused. Follow up blood pressure especially after steroids are completed and if remains elevated, should re-discuss starting an antihypertensive with the patient.   Please follow up with PCP, Dr. Sánchez, for further management. Principal Discharge DX:	Generalized abdominal pain  Goal:	Improved  Instructions for follow-up, activity and diet:	Likely due to constipation and mild acute pancreatitis (may have had a transient gallstone pancreatitis with a small stone that passed sponteously).  Continue with senna, colace, milk of magnesia, lactulose and dulcolax suspension as needed for constipation.  Follow up with PCP, Dr. Sánchez, within 3 days.  Follow up with GI, Dr. Candelario, within one week. Eat a low fat, low cholesterol diet.   If you have recurrence of significant abdominal pain, call your doctor or return to the ER.  Secondary Diagnosis:	COPD (chronic obstructive pulmonary disease)  Instructions for follow-up, activity and diet:	Please take Prednisone 15mg once daily for 5 days (3/10/17 through 3/14/17) to complete the steroid taper.    Please continue Spiriva and Advair and albuterol every 8 hours for now.    Follow up with Pulmonary, Dr. Ballesteros, within 3 days to further adjust respiratory regimen, as needed.  You should have repeat pulmonary imaging in the coming months as per your pulmonologist's recommendation.  Secondary Diagnosis:	Diastolic CHF  Instructions for follow-up, activity and diet:	Continue with lasix 20mg every other day. Follow up with PMD or cardiologist within a week.  Secondary Diagnosis:	MS (multiple sclerosis)  Instructions for follow-up, activity and diet:	Continue baclofen and gabapentin.  Secondary Diagnosis:	Hypothyroid  Instructions for follow-up, activity and diet:	Continue with synthroid.    TSH 3.86.  Please follow up with PCP, Dr. Sánchez, for further management of hypothyroidism.  Secondary Diagnosis:	HTN (hypertension)  Instructions for follow-up, activity and diet:	Blood pressure remained elevated through out hospital stay. No antihypertensive medication started as patient refused. Follow up blood pressure especially after steroids are completed and if remains elevated, should re-discuss starting an antihypertensive with the patient.   Please follow up with PCP, Dr. Sánchez, for further management.

## 2017-03-06 NOTE — DIETITIAN INITIAL EVALUATION ADULT. - PROBLEM SELECTOR PLAN 6
-acute vs chronic  - corrected Ca 12.3  - Pt stable, no EKG changes  - Possibly secondary to vit D administration  - On lasix  - Will monitor. Consider renal consult if calcium remains elevated.

## 2017-03-06 NOTE — DISCHARGE NOTE ADULT - CARE PROVIDER_API CALL
Margaret Sánchez), Internal Medicine; Rheumatology  [address] [place] [address]  Phone: [phone]  Fax: [phone] Margaret Sánchez), Internal Medicine; Rheumatology  [address] [facility]  [place] [address]  Phone: [phone]  Fax: [phone]    Sammy Ballesteros), Critical Care Medicine; HospicePalliative Medicine; Internal Medicine; Pulmonary Disease  [address] [place] [address]  Phone: [phone]  Fax: [phone]    Jaguar Candelario (), Gastroenterology; Internal Medicine  [address] [place] [address]  Phone: [phone]  Fax: [phone]

## 2017-03-06 NOTE — DISCHARGE NOTE ADULT - PLAN OF CARE
likely to due constipation and subclinical pancreatitis.  Continue with senna, colace, milk of magnesia and ?magnesium hydroxide suspension as needed for constipation.  Follow up with PCP, Dr. Aguirre, within 3 days. due to PNA.  Already completed dose of antibiotics as outpatient prior to admission.  No antibiotics at this time. Continue with synthroid.    TSH 3.86.  Please follow up with PCP, Dr. Aguirre, for further management of hypothyroidism. Blood pressure remained elevated through out hospital stay.  Please continue coreg 3.125mg twice a day.  Please follow up with PCP, Dr. Aguirre, for further management of HTN. Continue baclofen and gabapentin. New diagnosis Continue with lasix 20mg every other day. Please taper prednisone.  Please take ?20mg for 2 days and then 10mg for 2 days.  Continue protonix while on steroids as stenions can irritate stomach.    Please continue Spiriva and Advair.    Please continue tessalon perle and robitussin as needed for cough. Improved likely to due constipation and subclinical pancreatitis.  Continue with senna, colace, milk of magnesia, lactulose and dulcolax suspension as needed for constipation.  Follow up with PCP, Dr. Aguirre, within 3 days. due to pneumonia.    Already completed 2 courses of antibiotics as outpatient prior to admission.  No antibiotics at this time. Please take Prednisone 15mg for 5 days.  Continue protonix while on steroids as stenions can irritate stomach.    Please continue Spiriva and Advair.    Please continue tessalon perle and robitussin as needed for cough. Continue with synthroid.    TSH 3.86.  Please follow up with PCP, Dr. Sánchez, for further management of hypothyroidism. Blood pressure remained elevated through out hospital stay.  No antihypertensive medication started as patient refused.  Please follow up with PCP, Dr. Sánchez, for further management of HTN. likely to due constipation and mild acute pancreatitis.  Continue with senna, colace, milk of magnesia, lactulose and dulcolax suspension as needed for constipation.  Follow up with PCP, Dr. Sánchez, within 3 days.  Follow up with GI, Dr. Candelario, within one week. Please take Prednisone 15mg for 5 days.  Continue protonix while on steroids as steroids can irritate stomach.    Please continue Spiriva and Advair.    Please continue tessalon perle and robitussin as needed for cough.  Follow up with Pulmonary, Dr. Ballesteros, within 3 days. Please take Prednisone 15mg once daily for 5 days (3/10/17 through 3/14/17) to complete the steroid taper.    Please continue Spiriva and Advair and albuterol every 8 hours for now.    Follow up with Pulmonary, Dr. Ballesteros, within 3 days to further adjust respiratory regimen, as needed.  You should have repeat pulmonary imaging in the coming months as per your pulmonologist's recommendation. Blood pressure remained elevated through out hospital stay. No antihypertensive medication started as patient refused. Follow up blood pressure especially after steroids are completed and if remains elevated, should re-discuss starting an antihypertensive with the patient.   Please follow up with PCP, Dr. Sánchez, for further management. Likely due to constipation and mild acute pancreatitis (may have had a transient gallstone pancreatitis with a small stone that passed sponteously).  Continue with senna, colace, milk of magnesia, lactulose and dulcolax suspension as needed for constipation.  Follow up with PCP, Dr. Sánchez, within 3 days.  Follow up with GI, Dr. Candelario, within one week. Eat a low fat, low cholesterol diet.   If you have recurrence of significant abdominal pain, call your doctor or return to the ER. Continue with lasix 20mg every other day. Follow up with PMD or cardiologist within a week.

## 2017-03-06 NOTE — DISCHARGE NOTE ADULT - MEDICATION SUMMARY - MEDICATIONS TO STOP TAKING
I will STOP taking the medications listed below when I get home from the hospital:    PriLOSEC 20 mg oral delayed release capsule  -- 1 cap(s) by mouth once a day

## 2017-03-06 NOTE — DISCHARGE NOTE ADULT - SECONDARY DIAGNOSIS.
Sepsis COPD (chronic obstructive pulmonary disease) Diastolic CHF MS (multiple sclerosis) Hypothyroid HTN (hypertension)

## 2017-03-06 NOTE — DIETITIAN INITIAL EVALUATION ADULT. - PROBLEM SELECTOR PLAN 1
- Admit to [facility]  - Likely secondary to HCAP  - Leukocytosis and tachycardia present on admission. While possible that leuokocytosis is secondary to steroid use, will treat for sepsis 2 to PNA based on clinical findings and correlation with CT.   - Lactate 1.1  - Received levaquin x1 in ED. Since pt was recently treated with Abx, will consider continuing levaquin after f/u procalcitonin and pulm recs  - f/u blood and urine cx, am labs, procalcitonin, CRP   - f/u pulm consult (Dr. Ballesteros)  - strict Is&Os

## 2017-03-06 NOTE — DISCHARGE NOTE ADULT - MEDICATION SUMMARY - MEDICATIONS TO TAKE
I will START or STAY ON the medications listed below when I get home from the hospital:    predniSONE 10 mg oral tablet  -- 1 tab(s) by mouth once a day (give together with 5mg pill for total dose of 15mg oral once daily for 5 more days 03/10/17 through 03/14/17)  -- It is very important that you take or use this exactly as directed.  Do not skip doses or discontinue unless directed by your doctor.  Obtain medical advice before taking any non-prescription drugs as some may affect the action of this medication.  Take with food or milk.    -- Indication: For Steroid taper for recent COPD exacerbation    predniSONE 5 mg oral tablet  -- 1 tab(s) by mouth once a day (give together with 10mg pill for total dose of 15mg oral once daily for 5 more days 03/10/17 through 03/14/17)  -- It is very important that you take or use this exactly as directed.  Do not skip doses or discontinue unless directed by your doctor.  Obtain medical advice before taking any non-prescription drugs as some may affect the action of this medication.  Take with food or milk.    -- Indication: For Steroid taper for recent COPD exacerbation    acetaminophen 325 mg oral tablet  -- 2 tab(s) by mouth every 6 hours, As needed, pain  -- Indication: For for pain as needed    traMADol 50 mg oral tablet  --  by mouth every 6 hours, As Needed  -- Indication: For for pain as needed    Milk of Magnesia 8% oral suspension  --  by mouth , As Needed  -- Indication: For Dyspepsia as needed    Neurontin 100 mg oral capsule  -- 2 tab(s) by mouth once a day  -- Indication: For Neuropathic pain    benzonatate 200 mg oral capsule  -- 1 cap(s) by mouth 3 times a day - if needed for cough  -- Indication: For Cough if needed    Xanax 0.25 mg oral tablet  --  by mouth once a day (at bedtime), As Needed - for anxiety  -- Indication: For anxiety    albuterol 2.5 mg/3 mL (0.083%) inhalation solution  -- 3 milliliter(s) inhaled every 8 hours  -- Indication: For recent COPD exacerbation    Advair Diskus 250 mcg-50 mcg inhalation powder  -- 1 puff(s) inhaled 2 times a day  -- Indication: For COPD (chronic obstructive pulmonary disease)    Spiriva 18 mcg inhalation capsule  -- 1 cap(s) inhaled once a day  -- Indication: For COPD (chronic obstructive pulmonary disease)    Lasix 20 mg oral tablet  -- 1 tab by mouth every other day  -- Indication: For Diuretic    Mucinex 600 mg oral tablet, extended release  -- 1 tab(s) by mouth every 12 hours, As Needed - for cough  -- Indication: For Cough/congestion as needed    lactulose 10 g/15 mL oral syrup  -- 15 milliliter(s) by mouth once a day, As Needed - for constipation  -- Indication: For Constipation    Colace  -- 100 milligram(s) by mouth 3 times a day  -- Indication: For Constipation    Senokot 8.6 mg oral tablet  -- 2 tab(s) by mouth once a day (at bedtime)   -- Indication: For Constipation    baclofen  -- 30 milligram(s) by mouth once a day  -- Indication: For MS (multiple sclerosis)    Synthroid 100 mcg (0.1 mg) oral tablet  -- 1 tab(s) by mouth once a day  -- Indication: For Hypothyroid    Vitamin D3 50,000 intl units oral capsule  --  by mouth every 3 weeks  -- Indication: For vitamin d supplement

## 2017-03-06 NOTE — DIETITIAN INITIAL EVALUATION ADULT. - PERTINENT MEDS FT
Colace, Advair, Neurontin, Magnesium hydroxide, Zofran, protonix, prednisone, senna, ultram, glycerin suppository

## 2017-03-06 NOTE — DISCHARGE NOTE ADULT - ADDITIONAL INSTRUCTIONS
Continue with senna, colace, milk of magnesia and ?magnesium hydroxide suspension as needed for constipation.  New diagnosis of HTN.  Please continue coreg 3.125mg twice a day.  Follow up with PCP, Dr. Aguirre, within 3 days. Continue with senna, colace, milk of magnesia, lactulose and dulcolax suspension as needed for constipation.  Continue with prednisone 15mg for 5 days for COPD.  Follow up with PCP, Dr. Sánchez, within 3 days.  Follow up with GI, Dr. Candelario, within one week.  Follow up with Pulmonary, Dr. Ballesteros, within 3 days.    If you experience worsening of any symptoms, please go to ER. Follow up with PCP, Dr. Sánchez, within 3 days.  Follow up with GI, Dr. Candelario, within one week.  Follow up with Pulmonary, Dr. Ballesteros, within 3 days.    Also follow up with PMD about changing Vit D regimen when levels normalize

## 2017-03-06 NOTE — DIETITIAN INITIAL EVALUATION ADULT. - OTHER INFO
Pt admit with hx MS & COPD. Currently on DASH/TLC- recommend change to regular diet. Appetite is currently good per pt- ate about 75% at breakfast. Came from [facility] where she is on a regular diet and receives HiCal for additional calories prn. Discussed small, frequent meals for COPD nutrition management. High fiber nutrition therapy for constipation. Pt declines Ensure Enlive nutritional supplement at this time due to adequate po intake. Will monitor to assess future need.

## 2017-03-06 NOTE — DISCHARGE NOTE ADULT - HOSPITAL COURSE
72yo F with PMHx of MS and COPD BIBA from [facility] for abdominal pain and SOB. Patient stated that she has had  SOB, productive cough, rhinorrhea, decreased appetite, dizziness, and constipation x 1 week. Patient admited to 5 episodes of non-bloody vomiting last night. Patient stated her abdominal pain was 8/10 at its worst last night. She was given fleet enema last night and had a BM which resolved the abdominal pain. Patient denied CP, lightheadedness, HA, dysuria, pain with inspiration, calf tenderness, palpitations, incontinence. Denied sick contacts, muscle aches. Patient is bedbound at baseline.  As per Dr. Ballesteros, patient recently finished a course of steroids and antibiotics at .     In ED patient received levaquin x1, duonebs, 125mg IV solu-medrol. Significant labs for WBC 18.0 Alk Phos 164 Lipase 971 Amylase 307. Vitals: /90  RR 14 O2 sat 94% T 99 oral.  CTA and CT abdomen/pelvis: No pulmonary emboli. Emphysema. Left lower lobe pneumonia with trace left parapneumonic effusion. Small parenchymal nodules in the right and left lower lobes as described. Recommend three-month CT surveillance. Constipation. Generalized ileus.    Patient remained hypertensive (186/112) and tachycardic (105) in the ED and was given Lopressor 5 mg IVP once.     Pt was seen by pulmonary, Dr. Ballesteros, and abx were discontinued as pt just finished outpatient course.  Pt was found to have crackles in lower base and became SOB.  Pt was restarted on lasix home dose and fluids were discontinued.  Last echo 4/19 showed EF 75% with diastolic CHF.  Pt was continued on bowel regimen for constipation. 72yo F with PMHx of MS and COPD BIBA from [facility] for abdominal pain and SOB. Patient stated that she has had  SOB, productive cough, rhinorrhea, decreased appetite, dizziness, and constipation x 1 week. Patient admited to 5 episodes of non-bloody vomiting last night. Patient stated her abdominal pain was 8/10 at its worst last night. She was given fleet enema last night and had a BM which resolved the abdominal pain. Patient denied CP, lightheadedness, HA, dysuria, pain with inspiration, calf tenderness, palpitations, incontinence. Denied sick contacts, muscle aches. Patient is bedbound at baseline.  As per Dr. Ballesteros, patient recently finished a course of steroids and antibiotics at .     In ED patient received levaquin x1, duonebs, 125mg IV solu-medrol. Significant labs for WBC 18.0 Alk Phos 164 Lipase 971 Amylase 307. Vitals: /90  RR 14 O2 sat 94% T 99 oral.  CTA and CT abdomen/pelvis: No pulmonary emboli. Emphysema. Left lower lobe pneumonia with trace left parapneumonic effusion. Small parenchymal nodules in the right and left lower lobes as described. Recommend three-month CT surveillance. Constipation. Generalized ileus.    Patient remained hypertensive (186/112) and tachycardic (105) in the ED and was given Lopressor 5 mg IVP once.     Pt was seen by pulmonary, Dr. Ballesteros, and abx were discontinued as pt just finished outpatient course.  Blood and urine clx- negative.  Pt was found to have crackles in lower base and became SOB.  Pt was restarted on lasix home dose and fluids were discontinued.  Last echo 4/19 showed EF 75% with diastolic CHF.  As per pulmonary, pt was continued on prednisone and albuterol nebs for COPD.  Pt was continued on bowel regimen for constipation.  HIDA scan- normal hepatobiliary scan, no acute cholecystitis.  As per GI, pt has subclinical pancreatitis.  Lipase and amylase trended down to WNL.  Pt's BP remained elevated likely due to anxiety.      Pt is medically optimized for discharge to SNF. 70yo F with PMHx of MS and COPD BIBA from [facility] for abdominal pain and SOB. Patient stated that she has had  SOB, productive cough, rhinorrhea, decreased appetite, dizziness, and constipation x 1 week. Patient admited to 5 episodes of non-bloody vomiting last night. Patient stated her abdominal pain was 8/10 at its worst last night. She was given fleet enema last night and had a BM which resolved the abdominal pain. Patient denied CP, lightheadedness, HA, dysuria, pain with inspiration, calf tenderness, palpitations, incontinence. Denied sick contacts, muscle aches. Patient is bedbound at baseline.  As per Dr. Ballesteros, patient recently finished a course of steroids and antibiotics at .     In ED patient received levaquin x1, duonebs, 125mg IV solu-medrol. Significant labs for WBC 18.0 Alk Phos 164 Lipase 971 Amylase 307. Vitals: /90  RR 14 O2 sat 94% T 99 oral.  CTA and CT abdomen/pelvis: No pulmonary emboli. Emphysema. Left lower lobe pneumonia with trace left parapneumonic effusion. Small parenchymal nodules in the right and left lower lobes as described. Recommend three-month CT surveillance. Constipation. Generalized ileus.    Patient remained hypertensive (186/112) and tachycardic (105) in the ED and was given Lopressor 5 mg IVP once.     Pt was seen by pulmonary, Dr. Ballesteros, and abx were discontinued as pt just finished 2 outpatient antibiotic courses.  Blood and urine clx- negative.  As per pulmonary, pt was continued on prednisone and albuterol nebs for COPD.  Pt was continued on bowel regimen for constipation.  HIDA scan- normal hepatobiliary scan, no acute cholecystitis.  As per GI, pt has mild acute pancreatitis.  Lipase and amylase trended down to WNL.  Last echo 4/19 showed EF 75% with diastolic CHF.  Pt was found to have crackles in lower base and became SOB.  Pt was restarted on lasix home dose and fluids were discontinued.  Pt's BP remained elevated likely due to anxiety.  Pt refused antihypertensive medication.    Pt is medically optimized for discharge to SNF. HPI:  72yo F with PMHx of MS and COPD BIBA from [facility] for abdominal pain and SOB. Patient stated that she has had  SOB, productive cough, rhinorrhea, decreased appetite, dizziness, and constipation x 1 week. Patient admited to 5 episodes of non-bloody vomiting last night. Patient stated her abdominal pain was 8/10 at its worst last night. She was given fleet enema last night and had a BM which resolved the abdominal pain. Patient denied CP, lightheadedness, HA, dysuria, pain with inspiration, calf tenderness, palpitations, incontinence. Denied sick contacts, muscle aches. Patient is bedbound at baseline.  As per Dr. Ballesteros, patient recently finished a course of steroids and antibiotics at .     Hospital Course:  In ED patient received levaquin x1, duonebs, 125mg IV solu-medrol. Significant labs for WBC 18.0 Alk Phos 164 Lipase 971 Amylase 307. Vitals: /90  RR 14 O2 sat 94% T 99 oral.  CTA and CT abdomen/pelvis: No pulmonary emboli. Emphysema. Left lower lobe pneumonia with trace left parapneumonic effusion. Small parenchymal nodules in the right and left lower lobes as described. Recommend three-month CT surveillance. Constipation. Generalized ileus. Pancreas unremarkable.     Pt was seen by pulmonary, Dr. Ballesteros, (who has been treating the pt in [facility]) and abx were discontinued as pt just finished 2 outpatient antibiotic courses and changes in CT were likely radiologic lag of treated pneumonia.  Blood and urine Cx- negative.  As per pulmonary, pt was continued on prednisone taper and albuterol nebs she was taking for a recent COPD exacerbation. RUQ US (somewhat limited from underdistention of GB), showed likely sludge and small stones in gallbladder lumen. Normal CBD diameter. Questionable mild GB wall thickening which can be false positive in setting of underdistended GB.   Pt had a HIDA scan, which showed: normal hepatobiliary scan, no acute cholecystitis.   Pt likely had mild acute pancreatitis from a transient obstruction from a small stone that spontaneously passed.  Lipase and amylase trended down to WNL in less than 24 hours. Pt's leukocytosis, which may have related to the pancreatitis and also to steroids, trended down without antibiotics.  Pt's BP trended down to SBP in the 130-160s range.  Pt refused antihypertensive medication even when BP was significantly elevated.  Phys therapy recommended for pt to return to [facility] and have some phys therapy there.   Pt feels much improved. VS stable.    Pt is medically optimized for discharge to [facility].    PMD notified of the discharge.    Time spent on discharge: 45min

## 2017-03-06 NOTE — DIETITIAN INITIAL EVALUATION ADULT. - NS AS NUTRI INTERV MEALS SNACK
Fiber - modified diet/Composition of meals/snacks/Change diet to regular. Discussed small frequent meals & high fiber/fluids for constipation nutrition therapy/General/healthful diet/Other (specify)

## 2017-03-07 LAB
ANION GAP SERPL CALC-SCNC: 5 MMOL/L — SIGNIFICANT CHANGE UP (ref 5–17)
BUN SERPL-MCNC: 24 MG/DL — HIGH (ref 7–23)
CALCIUM SERPL-MCNC: 9.9 MG/DL — SIGNIFICANT CHANGE UP (ref 8.5–10.1)
CHLORIDE SERPL-SCNC: 104 MMOL/L — SIGNIFICANT CHANGE UP (ref 96–108)
CO2 SERPL-SCNC: 36 MMOL/L — HIGH (ref 22–31)
CREAT SERPL-MCNC: 0.48 MG/DL — LOW (ref 0.5–1.3)
GLUCOSE SERPL-MCNC: 77 MG/DL — SIGNIFICANT CHANGE UP (ref 70–99)
HCT VFR BLD CALC: 37.1 % — SIGNIFICANT CHANGE UP (ref 34.5–45)
HGB BLD-MCNC: 11.6 G/DL — SIGNIFICANT CHANGE UP (ref 11.5–15.5)
IGG SERPL-MCNC: 801 MG/DL — SIGNIFICANT CHANGE UP (ref 767–1590)
IGG1 SER-MCNC: 519 MG/DL — SIGNIFICANT CHANGE UP (ref 341–894)
IGG2 SER-MCNC: 122 MG/DL — LOW (ref 171–632)
IGG3 SER-MCNC: 52.8 MG/DL — SIGNIFICANT CHANGE UP (ref 18.4–106)
IGG4 SER-MCNC: 17.4 MG/DL — SIGNIFICANT CHANGE UP (ref 2.4–121)
LIDOCAIN IGE QN: 168 U/L — SIGNIFICANT CHANGE UP (ref 73–393)
MAGNESIUM SERPL-MCNC: 2.5 MG/DL — HIGH (ref 1.8–2.4)
MCHC RBC-ENTMCNC: 28.5 PG — SIGNIFICANT CHANGE UP (ref 27–34)
MCHC RBC-ENTMCNC: 31.4 GM/DL — LOW (ref 32–36)
MCV RBC AUTO: 91 FL — SIGNIFICANT CHANGE UP (ref 80–100)
PHOSPHATE SERPL-MCNC: 1.9 MG/DL — LOW (ref 2.5–4.5)
PLATELET # BLD AUTO: 520 K/UL — HIGH (ref 150–400)
POTASSIUM SERPL-MCNC: 3.9 MMOL/L — SIGNIFICANT CHANGE UP (ref 3.5–5.3)
POTASSIUM SERPL-SCNC: 3.9 MMOL/L — SIGNIFICANT CHANGE UP (ref 3.5–5.3)
RBC # BLD: 4.08 M/UL — SIGNIFICANT CHANGE UP (ref 3.8–5.2)
RBC # FLD: 13.3 % — SIGNIFICANT CHANGE UP (ref 10.3–14.5)
SODIUM SERPL-SCNC: 145 MMOL/L — SIGNIFICANT CHANGE UP (ref 135–145)
WBC # BLD: 9.7 K/UL — SIGNIFICANT CHANGE UP (ref 3.8–10.5)
WBC # FLD AUTO: 9.7 K/UL — SIGNIFICANT CHANGE UP (ref 3.8–10.5)

## 2017-03-07 PROCEDURE — 78226 HEPATOBILIARY SYSTEM IMAGING: CPT | Mod: 26

## 2017-03-07 PROCEDURE — 99233 SBSQ HOSP IP/OBS HIGH 50: CPT | Mod: GC

## 2017-03-07 RX ORDER — POTASSIUM PHOSPHATE, MONOBASIC POTASSIUM PHOSPHATE, DIBASIC 236; 224 MG/ML; MG/ML
15 INJECTION, SOLUTION INTRAVENOUS ONCE
Qty: 0 | Refills: 0 | Status: COMPLETED | OUTPATIENT
Start: 2017-03-07 | End: 2017-03-07

## 2017-03-07 RX ORDER — PANTOPRAZOLE SODIUM 20 MG/1
1 TABLET, DELAYED RELEASE ORAL
Qty: 7 | Refills: 0 | OUTPATIENT
Start: 2017-03-07 | End: 2017-03-14

## 2017-03-07 RX ORDER — CARVEDILOL PHOSPHATE 80 MG/1
3.12 CAPSULE, EXTENDED RELEASE ORAL EVERY 12 HOURS
Qty: 0 | Refills: 0 | Status: DISCONTINUED | OUTPATIENT
Start: 2017-03-07 | End: 2017-03-07

## 2017-03-07 RX ORDER — PANTOPRAZOLE SODIUM 20 MG/1
1 TABLET, DELAYED RELEASE ORAL
Qty: 0 | Refills: 0 | COMMUNITY
Start: 2017-03-07

## 2017-03-07 RX ORDER — SENNA PLUS 8.6 MG/1
2 TABLET ORAL
Qty: 0 | Refills: 0 | COMMUNITY

## 2017-03-07 RX ORDER — DOCUSATE SODIUM 100 MG
300 CAPSULE ORAL
Qty: 0 | Refills: 0 | COMMUNITY

## 2017-03-07 RX ORDER — CARVEDILOL PHOSPHATE 80 MG/1
1 CAPSULE, EXTENDED RELEASE ORAL
Qty: 14 | Refills: 0 | OUTPATIENT
Start: 2017-03-07 | End: 2017-03-14

## 2017-03-07 RX ADMIN — Medication 100 MILLIGRAM(S): at 13:20

## 2017-03-07 RX ADMIN — Medication 100 MILLIGRAM(S): at 11:05

## 2017-03-07 RX ADMIN — Medication 5 MILLIGRAM(S): at 15:17

## 2017-03-07 RX ADMIN — TIOTROPIUM BROMIDE 1 CAPSULE(S): 18 CAPSULE ORAL; RESPIRATORY (INHALATION) at 11:07

## 2017-03-07 RX ADMIN — GABAPENTIN 200 MILLIGRAM(S): 400 CAPSULE ORAL at 11:05

## 2017-03-07 RX ADMIN — TRAMADOL HYDROCHLORIDE 50 MILLIGRAM(S): 50 TABLET ORAL at 13:32

## 2017-03-07 RX ADMIN — Medication 30 MILLIGRAM(S): at 05:47

## 2017-03-07 RX ADMIN — Medication 30 MILLIGRAM(S): at 21:08

## 2017-03-07 RX ADMIN — ENOXAPARIN SODIUM 40 MILLIGRAM(S): 100 INJECTION SUBCUTANEOUS at 11:06

## 2017-03-07 RX ADMIN — Medication 200 MILLIGRAM(S): at 05:47

## 2017-03-07 RX ADMIN — Medication 1 DROP(S): at 11:06

## 2017-03-07 RX ADMIN — PANTOPRAZOLE SODIUM 40 MILLIGRAM(S): 20 TABLET, DELAYED RELEASE ORAL at 05:47

## 2017-03-07 RX ADMIN — TRAMADOL HYDROCHLORIDE 50 MILLIGRAM(S): 50 TABLET ORAL at 11:05

## 2017-03-07 RX ADMIN — FLUTICASONE PROPIONATE AND SALMETEROL 1 DOSE(S): 50; 250 POWDER ORAL; RESPIRATORY (INHALATION) at 11:10

## 2017-03-07 RX ADMIN — Medication 20 MILLIGRAM(S): at 11:07

## 2017-03-07 RX ADMIN — POTASSIUM PHOSPHATE, MONOBASIC POTASSIUM PHOSPHATE, DIBASIC 63.75 MILLIMOLE(S): 236; 224 INJECTION, SOLUTION INTRAVENOUS at 11:09

## 2017-03-07 RX ADMIN — FLUTICASONE PROPIONATE AND SALMETEROL 1 DOSE(S): 50; 250 POWDER ORAL; RESPIRATORY (INHALATION) at 17:55

## 2017-03-07 RX ADMIN — Medication 100 MILLIGRAM(S): at 21:08

## 2017-03-07 RX ADMIN — SENNA PLUS 2 TABLET(S): 8.6 TABLET ORAL at 21:08

## 2017-03-07 RX ADMIN — Medication 0.25 MILLIGRAM(S): at 19:58

## 2017-03-07 RX ADMIN — ALBUTEROL 2.5 MILLIGRAM(S): 90 AEROSOL, METERED ORAL at 15:35

## 2017-03-07 RX ADMIN — Medication 100 MILLIGRAM(S): at 05:47

## 2017-03-07 RX ADMIN — ALBUTEROL 2.5 MILLIGRAM(S): 90 AEROSOL, METERED ORAL at 07:33

## 2017-03-07 RX ADMIN — Medication 100 MICROGRAM(S): at 05:47

## 2017-03-08 LAB
ANA PAT FLD IF-IMP: ABNORMAL
ANA TITR SER: ABNORMAL
ANION GAP SERPL CALC-SCNC: 6 MMOL/L — SIGNIFICANT CHANGE UP (ref 5–17)
BUN SERPL-MCNC: 14 MG/DL — SIGNIFICANT CHANGE UP (ref 7–23)
CALCIUM SERPL-MCNC: 9.4 MG/DL — SIGNIFICANT CHANGE UP (ref 8.5–10.1)
CHLORIDE SERPL-SCNC: 102 MMOL/L — SIGNIFICANT CHANGE UP (ref 96–108)
CO2 SERPL-SCNC: 34 MMOL/L — HIGH (ref 22–31)
CREAT SERPL-MCNC: 0.54 MG/DL — SIGNIFICANT CHANGE UP (ref 0.5–1.3)
GLUCOSE SERPL-MCNC: 92 MG/DL — SIGNIFICANT CHANGE UP (ref 70–99)
HCT VFR BLD CALC: 39.6 % — SIGNIFICANT CHANGE UP (ref 34.5–45)
HGB BLD-MCNC: 12.7 G/DL — SIGNIFICANT CHANGE UP (ref 11.5–15.5)
MAGNESIUM SERPL-MCNC: 2.4 MG/DL — SIGNIFICANT CHANGE UP (ref 1.8–2.4)
MCHC RBC-ENTMCNC: 29 PG — SIGNIFICANT CHANGE UP (ref 27–34)
MCHC RBC-ENTMCNC: 32 GM/DL — SIGNIFICANT CHANGE UP (ref 32–36)
MCV RBC AUTO: 90.7 FL — SIGNIFICANT CHANGE UP (ref 80–100)
PHOSPHATE SERPL-MCNC: 1.7 MG/DL — LOW (ref 2.5–4.5)
PLATELET # BLD AUTO: 517 K/UL — HIGH (ref 150–400)
POTASSIUM SERPL-MCNC: 4 MMOL/L — SIGNIFICANT CHANGE UP (ref 3.5–5.3)
POTASSIUM SERPL-SCNC: 4 MMOL/L — SIGNIFICANT CHANGE UP (ref 3.5–5.3)
RBC # BLD: 4.36 M/UL — SIGNIFICANT CHANGE UP (ref 3.8–5.2)
RBC # FLD: 13.2 % — SIGNIFICANT CHANGE UP (ref 10.3–14.5)
SODIUM SERPL-SCNC: 142 MMOL/L — SIGNIFICANT CHANGE UP (ref 135–145)
WBC # BLD: 16.6 K/UL — HIGH (ref 3.8–10.5)
WBC # FLD AUTO: 16.6 K/UL — HIGH (ref 3.8–10.5)

## 2017-03-08 PROCEDURE — 99233 SBSQ HOSP IP/OBS HIGH 50: CPT | Mod: GC

## 2017-03-08 RX ORDER — SODIUM,POTASSIUM PHOSPHATES 278-250MG
1 POWDER IN PACKET (EA) ORAL
Qty: 0 | Refills: 0 | Status: COMPLETED | OUTPATIENT
Start: 2017-03-08 | End: 2017-03-08

## 2017-03-08 RX ORDER — POTASSIUM PHOSPHATE, MONOBASIC POTASSIUM PHOSPHATE, DIBASIC 236; 224 MG/ML; MG/ML
15 INJECTION, SOLUTION INTRAVENOUS ONCE
Qty: 0 | Refills: 0 | Status: COMPLETED | OUTPATIENT
Start: 2017-03-08 | End: 2017-03-08

## 2017-03-08 RX ORDER — GLYCERIN ADULT
1 SUPPOSITORY, RECTAL RECTAL ONCE
Qty: 0 | Refills: 0 | Status: COMPLETED | OUTPATIENT
Start: 2017-03-08 | End: 2017-03-08

## 2017-03-08 RX ADMIN — Medication 1 TABLET(S): at 12:10

## 2017-03-08 RX ADMIN — FLUTICASONE PROPIONATE AND SALMETEROL 1 DOSE(S): 50; 250 POWDER ORAL; RESPIRATORY (INHALATION) at 17:22

## 2017-03-08 RX ADMIN — Medication 100 MICROGRAM(S): at 05:51

## 2017-03-08 RX ADMIN — Medication 30 MILLIGRAM(S): at 05:51

## 2017-03-08 RX ADMIN — Medication 30 MILLIGRAM(S): at 21:18

## 2017-03-08 RX ADMIN — Medication 100 MILLIGRAM(S): at 05:51

## 2017-03-08 RX ADMIN — Medication 100 MILLIGRAM(S): at 21:17

## 2017-03-08 RX ADMIN — TIOTROPIUM BROMIDE 1 CAPSULE(S): 18 CAPSULE ORAL; RESPIRATORY (INHALATION) at 09:49

## 2017-03-08 RX ADMIN — Medication 1 TABLET(S): at 17:22

## 2017-03-08 RX ADMIN — PANTOPRAZOLE SODIUM 40 MILLIGRAM(S): 20 TABLET, DELAYED RELEASE ORAL at 05:51

## 2017-03-08 RX ADMIN — TRAMADOL HYDROCHLORIDE 50 MILLIGRAM(S): 50 TABLET ORAL at 09:44

## 2017-03-08 RX ADMIN — FLUTICASONE PROPIONATE AND SALMETEROL 1 DOSE(S): 50; 250 POWDER ORAL; RESPIRATORY (INHALATION) at 09:49

## 2017-03-08 RX ADMIN — ALBUTEROL 2.5 MILLIGRAM(S): 90 AEROSOL, METERED ORAL at 15:31

## 2017-03-08 RX ADMIN — ENOXAPARIN SODIUM 40 MILLIGRAM(S): 100 INJECTION SUBCUTANEOUS at 11:30

## 2017-03-08 RX ADMIN — Medication 0.25 MILLIGRAM(S): at 21:17

## 2017-03-08 RX ADMIN — Medication 100 MILLIGRAM(S): at 14:09

## 2017-03-08 RX ADMIN — GABAPENTIN 200 MILLIGRAM(S): 400 CAPSULE ORAL at 11:30

## 2017-03-08 RX ADMIN — Medication 1 TABLET(S): at 21:17

## 2017-03-08 RX ADMIN — POTASSIUM PHOSPHATE, MONOBASIC POTASSIUM PHOSPHATE, DIBASIC 63.75 MILLIMOLE(S): 236; 224 INJECTION, SOLUTION INTRAVENOUS at 11:30

## 2017-03-08 RX ADMIN — Medication 100 MILLIGRAM(S): at 11:30

## 2017-03-08 RX ADMIN — ALBUTEROL 2.5 MILLIGRAM(S): 90 AEROSOL, METERED ORAL at 07:25

## 2017-03-08 RX ADMIN — TRAMADOL HYDROCHLORIDE 50 MILLIGRAM(S): 50 TABLET ORAL at 10:48

## 2017-03-08 RX ADMIN — Medication 1 SUPPOSITORY(S): at 11:30

## 2017-03-08 NOTE — PHYSICAL THERAPY INITIAL EVALUATION ADULT - PERTINENT HX OF CURRENT PROBLEM, REHAB EVAL
70yo F with PMHx of MS and COPD BIBA from [facility] for abdominal pain and SOB. Patient states that she has had  SOB, productive cough, rhinorrhea, decreased appetite, dizziness, and constipation x 1 week.

## 2017-03-09 VITALS
OXYGEN SATURATION: 100 % | SYSTOLIC BLOOD PRESSURE: 145 MMHG | RESPIRATION RATE: 18 BRPM | TEMPERATURE: 98 F | HEART RATE: 84 BPM | DIASTOLIC BLOOD PRESSURE: 81 MMHG

## 2017-03-09 LAB
ANION GAP SERPL CALC-SCNC: 7 MMOL/L — SIGNIFICANT CHANGE UP (ref 5–17)
BASOPHILS # BLD AUTO: 0.1 K/UL — SIGNIFICANT CHANGE UP (ref 0–0.2)
BASOPHILS NFR BLD AUTO: 0.8 % — SIGNIFICANT CHANGE UP (ref 0–2)
BUN SERPL-MCNC: 14 MG/DL — SIGNIFICANT CHANGE UP (ref 7–23)
CALCIUM SERPL-MCNC: 9.3 MG/DL — SIGNIFICANT CHANGE UP (ref 8.5–10.1)
CHLORIDE SERPL-SCNC: 101 MMOL/L — SIGNIFICANT CHANGE UP (ref 96–108)
CO2 SERPL-SCNC: 33 MMOL/L — HIGH (ref 22–31)
CREAT SERPL-MCNC: 0.52 MG/DL — SIGNIFICANT CHANGE UP (ref 0.5–1.3)
EOSINOPHIL # BLD AUTO: 0.1 K/UL — SIGNIFICANT CHANGE UP (ref 0–0.5)
EOSINOPHIL NFR BLD AUTO: 0.6 % — SIGNIFICANT CHANGE UP (ref 0–6)
GLUCOSE SERPL-MCNC: 95 MG/DL — SIGNIFICANT CHANGE UP (ref 70–99)
HCT VFR BLD CALC: 38.4 % — SIGNIFICANT CHANGE UP (ref 34.5–45)
HGB BLD-MCNC: 12 G/DL — SIGNIFICANT CHANGE UP (ref 11.5–15.5)
LYMPHOCYTES # BLD AUTO: 1.9 K/UL — SIGNIFICANT CHANGE UP (ref 1–3.3)
LYMPHOCYTES # BLD AUTO: 16.2 % — SIGNIFICANT CHANGE UP (ref 13–44)
MAGNESIUM SERPL-MCNC: 2.3 MG/DL — SIGNIFICANT CHANGE UP (ref 1.8–2.4)
MCHC RBC-ENTMCNC: 29.1 PG — SIGNIFICANT CHANGE UP (ref 27–34)
MCHC RBC-ENTMCNC: 31.1 GM/DL — LOW (ref 32–36)
MCV RBC AUTO: 93.5 FL — SIGNIFICANT CHANGE UP (ref 80–100)
MONOCYTES # BLD AUTO: 0.7 K/UL — SIGNIFICANT CHANGE UP (ref 0–0.9)
MONOCYTES NFR BLD AUTO: 5.8 % — SIGNIFICANT CHANGE UP (ref 1–9)
NEUTROPHILS # BLD AUTO: 8.9 K/UL — HIGH (ref 1.8–7.4)
NEUTROPHILS NFR BLD AUTO: 76.6 % — SIGNIFICANT CHANGE UP (ref 43–77)
PHOSPHATE SERPL-MCNC: 2.3 MG/DL — LOW (ref 2.5–4.5)
PLATELET # BLD AUTO: 528 K/UL — HIGH (ref 150–400)
POTASSIUM SERPL-MCNC: 3.9 MMOL/L — SIGNIFICANT CHANGE UP (ref 3.5–5.3)
POTASSIUM SERPL-SCNC: 3.9 MMOL/L — SIGNIFICANT CHANGE UP (ref 3.5–5.3)
RBC # BLD: 4.11 M/UL — SIGNIFICANT CHANGE UP (ref 3.8–5.2)
RBC # FLD: 13.4 % — SIGNIFICANT CHANGE UP (ref 10.3–14.5)
SODIUM SERPL-SCNC: 141 MMOL/L — SIGNIFICANT CHANGE UP (ref 135–145)
WBC # BLD: 11.6 K/UL — HIGH (ref 3.8–10.5)
WBC # FLD AUTO: 11.6 K/UL — HIGH (ref 3.8–10.5)

## 2017-03-09 PROCEDURE — 93970 EXTREMITY STUDY: CPT

## 2017-03-09 PROCEDURE — 87798 DETECT AGENT NOS DNA AMP: CPT

## 2017-03-09 PROCEDURE — 80048 BASIC METABOLIC PNL TOTAL CA: CPT

## 2017-03-09 PROCEDURE — 74177 CT ABD & PELVIS W/CONTRAST: CPT

## 2017-03-09 PROCEDURE — 96372 THER/PROPH/DIAG INJ SC/IM: CPT | Mod: 59

## 2017-03-09 PROCEDURE — 84478 ASSAY OF TRIGLYCERIDES: CPT

## 2017-03-09 PROCEDURE — 84145 PROCALCITONIN (PCT): CPT

## 2017-03-09 PROCEDURE — 84100 ASSAY OF PHOSPHORUS: CPT

## 2017-03-09 PROCEDURE — 85027 COMPLETE CBC AUTOMATED: CPT

## 2017-03-09 PROCEDURE — 86038 ANTINUCLEAR ANTIBODIES: CPT

## 2017-03-09 PROCEDURE — 85730 THROMBOPLASTIN TIME PARTIAL: CPT

## 2017-03-09 PROCEDURE — 80053 COMPREHEN METABOLIC PANEL: CPT

## 2017-03-09 PROCEDURE — 76705 ECHO EXAM OF ABDOMEN: CPT

## 2017-03-09 PROCEDURE — 82787 IGG 1 2 3 OR 4 EACH: CPT

## 2017-03-09 PROCEDURE — 87086 URINE CULTURE/COLONY COUNT: CPT

## 2017-03-09 PROCEDURE — 71045 X-RAY EXAM CHEST 1 VIEW: CPT

## 2017-03-09 PROCEDURE — 84443 ASSAY THYROID STIM HORMONE: CPT

## 2017-03-09 PROCEDURE — 83735 ASSAY OF MAGNESIUM: CPT

## 2017-03-09 PROCEDURE — 99285 EMERGENCY DEPT VISIT HI MDM: CPT | Mod: 25

## 2017-03-09 PROCEDURE — 87486 CHLMYD PNEUM DNA AMP PROBE: CPT

## 2017-03-09 PROCEDURE — 83880 ASSAY OF NATRIURETIC PEPTIDE: CPT

## 2017-03-09 PROCEDURE — 78226 HEPATOBILIARY SYSTEM IMAGING: CPT

## 2017-03-09 PROCEDURE — 96375 TX/PRO/DX INJ NEW DRUG ADDON: CPT

## 2017-03-09 PROCEDURE — 99221 1ST HOSP IP/OBS SF/LOW 40: CPT

## 2017-03-09 PROCEDURE — 83605 ASSAY OF LACTIC ACID: CPT

## 2017-03-09 PROCEDURE — A9537: CPT

## 2017-03-09 PROCEDURE — 93005 ELECTROCARDIOGRAM TRACING: CPT

## 2017-03-09 PROCEDURE — 96365 THER/PROPH/DIAG IV INF INIT: CPT | Mod: 59

## 2017-03-09 PROCEDURE — 87040 BLOOD CULTURE FOR BACTERIA: CPT

## 2017-03-09 PROCEDURE — 71275 CT ANGIOGRAPHY CHEST: CPT

## 2017-03-09 PROCEDURE — 87633 RESP VIRUS 12-25 TARGETS: CPT

## 2017-03-09 PROCEDURE — 86140 C-REACTIVE PROTEIN: CPT

## 2017-03-09 PROCEDURE — 81001 URINALYSIS AUTO W/SCOPE: CPT

## 2017-03-09 PROCEDURE — 82465 ASSAY BLD/SERUM CHOLESTEROL: CPT

## 2017-03-09 PROCEDURE — 83690 ASSAY OF LIPASE: CPT

## 2017-03-09 PROCEDURE — 99239 HOSP IP/OBS DSCHRG MGMT >30: CPT

## 2017-03-09 PROCEDURE — 94640 AIRWAY INHALATION TREATMENT: CPT

## 2017-03-09 PROCEDURE — 94760 N-INVAS EAR/PLS OXIMETRY 1: CPT

## 2017-03-09 PROCEDURE — 87581 M.PNEUMON DNA AMP PROBE: CPT

## 2017-03-09 PROCEDURE — 85610 PROTHROMBIN TIME: CPT

## 2017-03-09 PROCEDURE — 82150 ASSAY OF AMYLASE: CPT

## 2017-03-09 RX ORDER — DOCUSATE SODIUM 100 MG
100 CAPSULE ORAL
Qty: 0 | Refills: 0 | COMMUNITY

## 2017-03-09 RX ORDER — ACETAMINOPHEN 500 MG
650 TABLET ORAL EVERY 6 HOURS
Qty: 0 | Refills: 0 | Status: DISCONTINUED | OUTPATIENT
Start: 2017-03-09 | End: 2017-03-09

## 2017-03-09 RX ORDER — ALBUTEROL 90 UG/1
3 AEROSOL, METERED ORAL
Qty: 0 | Refills: 0 | COMMUNITY
Start: 2017-03-09

## 2017-03-09 RX ORDER — SENNA PLUS 8.6 MG/1
2 TABLET ORAL
Qty: 0 | Refills: 0 | COMMUNITY

## 2017-03-09 RX ORDER — ALPRAZOLAM 0.25 MG
0 TABLET ORAL
Qty: 0 | Refills: 0 | COMMUNITY

## 2017-03-09 RX ORDER — LACTULOSE 10 G/15ML
15 SOLUTION ORAL
Qty: 0 | Refills: 0 | COMMUNITY

## 2017-03-09 RX ORDER — DOCUSATE SODIUM 100 MG
300 CAPSULE ORAL
Qty: 0 | Refills: 0 | COMMUNITY

## 2017-03-09 RX ORDER — FUROSEMIDE 40 MG
0 TABLET ORAL
Qty: 0 | Refills: 0 | COMMUNITY

## 2017-03-09 RX ORDER — ACETAMINOPHEN 500 MG
2 TABLET ORAL
Qty: 0 | Refills: 0 | COMMUNITY
Start: 2017-03-09

## 2017-03-09 RX ORDER — FUROSEMIDE 40 MG
1 TABLET ORAL
Qty: 0 | Refills: 0 | COMMUNITY

## 2017-03-09 RX ORDER — OMEPRAZOLE 10 MG/1
1 CAPSULE, DELAYED RELEASE ORAL
Qty: 0 | Refills: 0 | COMMUNITY

## 2017-03-09 RX ORDER — POTASSIUM PHOSPHATE, MONOBASIC POTASSIUM PHOSPHATE, DIBASIC 236; 224 MG/ML; MG/ML
15 INJECTION, SOLUTION INTRAVENOUS ONCE
Qty: 0 | Refills: 0 | Status: DISCONTINUED | OUTPATIENT
Start: 2017-03-09 | End: 2017-03-09

## 2017-03-09 RX ORDER — SODIUM,POTASSIUM PHOSPHATES 278-250MG
1 POWDER IN PACKET (EA) ORAL ONCE
Qty: 0 | Refills: 0 | Status: COMPLETED | OUTPATIENT
Start: 2017-03-09 | End: 2017-03-09

## 2017-03-09 RX ADMIN — Medication 100 MILLIGRAM(S): at 13:03

## 2017-03-09 RX ADMIN — TIOTROPIUM BROMIDE 1 CAPSULE(S): 18 CAPSULE ORAL; RESPIRATORY (INHALATION) at 07:20

## 2017-03-09 RX ADMIN — Medication 100 MILLIGRAM(S): at 13:02

## 2017-03-09 RX ADMIN — ALBUTEROL 2.5 MILLIGRAM(S): 90 AEROSOL, METERED ORAL at 00:04

## 2017-03-09 RX ADMIN — Medication 20 MILLIGRAM(S): at 05:10

## 2017-03-09 RX ADMIN — Medication 100 MICROGRAM(S): at 05:10

## 2017-03-09 RX ADMIN — FLUTICASONE PROPIONATE AND SALMETEROL 1 DOSE(S): 50; 250 POWDER ORAL; RESPIRATORY (INHALATION) at 05:10

## 2017-03-09 RX ADMIN — Medication 1 TABLET(S): at 13:03

## 2017-03-09 RX ADMIN — Medication 650 MILLIGRAM(S): at 14:01

## 2017-03-09 RX ADMIN — PANTOPRAZOLE SODIUM 40 MILLIGRAM(S): 20 TABLET, DELAYED RELEASE ORAL at 05:10

## 2017-03-09 RX ADMIN — ALBUTEROL 2.5 MILLIGRAM(S): 90 AEROSOL, METERED ORAL at 08:13

## 2017-03-09 RX ADMIN — ENOXAPARIN SODIUM 40 MILLIGRAM(S): 100 INJECTION SUBCUTANEOUS at 13:04

## 2017-03-09 RX ADMIN — Medication 100 MILLIGRAM(S): at 05:10

## 2017-03-09 RX ADMIN — GABAPENTIN 200 MILLIGRAM(S): 400 CAPSULE ORAL at 13:02

## 2017-03-10 LAB
CULTURE RESULTS: SIGNIFICANT CHANGE UP
CULTURE RESULTS: SIGNIFICANT CHANGE UP
SPECIMEN SOURCE: SIGNIFICANT CHANGE UP
SPECIMEN SOURCE: SIGNIFICANT CHANGE UP

## 2017-03-13 DIAGNOSIS — J96.21 ACUTE AND CHRONIC RESPIRATORY FAILURE WITH HYPOXIA: ICD-10-CM

## 2017-03-13 DIAGNOSIS — K59.00 CONSTIPATION, UNSPECIFIED: ICD-10-CM

## 2017-03-13 DIAGNOSIS — K80.20 CALCULUS OF GALLBLADDER WITHOUT CHOLECYSTITIS WITHOUT OBSTRUCTION: ICD-10-CM

## 2017-03-13 DIAGNOSIS — E03.9 HYPOTHYROIDISM, UNSPECIFIED: ICD-10-CM

## 2017-03-13 DIAGNOSIS — I50.32 CHRONIC DIASTOLIC (CONGESTIVE) HEART FAILURE: ICD-10-CM

## 2017-03-13 DIAGNOSIS — G35 MULTIPLE SCLEROSIS: ICD-10-CM

## 2017-03-13 DIAGNOSIS — Z74.01 BED CONFINEMENT STATUS: ICD-10-CM

## 2017-03-13 DIAGNOSIS — I11.0 HYPERTENSIVE HEART DISEASE WITH HEART FAILURE: ICD-10-CM

## 2017-03-13 DIAGNOSIS — Z87.891 PERSONAL HISTORY OF NICOTINE DEPENDENCE: ICD-10-CM

## 2017-03-13 DIAGNOSIS — J18.9 PNEUMONIA, UNSPECIFIED ORGANISM: ICD-10-CM

## 2017-03-13 DIAGNOSIS — K56.7 ILEUS, UNSPECIFIED: ICD-10-CM

## 2017-03-13 DIAGNOSIS — K85.90 ACUTE PANCREATITIS WITHOUT NECROSIS OR INFECTION, UNSPECIFIED: ICD-10-CM

## 2017-03-13 DIAGNOSIS — J44.1 CHRONIC OBSTRUCTIVE PULMONARY DISEASE WITH (ACUTE) EXACERBATION: ICD-10-CM

## 2017-03-13 DIAGNOSIS — Z88.0 ALLERGY STATUS TO PENICILLIN: ICD-10-CM

## 2018-08-07 NOTE — H&P ADULT. - RS GEN HX ROS MEA POS PC
Katt called back, she gave the wrong fax number before. Please resend to fax number: [phone]   wheezing/dyspnea/cough

## 2019-07-15 NOTE — H&P ADULT. - SKIN/BREAST
Transition of care performed with sharing of clinical summary Transition of care performed with sharing of clinical summary Transition of care performed with sharing of clinical summary negative

## 2023-03-08 NOTE — DIETITIAN INITIAL EVALUATION ADULT. - NS AS NUTRI INTERV VITAMIN
Multivitamin/mineral
decreased: anxiety related to hospital/staff/environment/decreased: anxiety related to treatment/procedure/increased: participation in developmentally appropriate interventions/increased: ability to cope/increased: sense of control/mastery